# Patient Record
Sex: MALE | Race: ASIAN | NOT HISPANIC OR LATINO | URBAN - METROPOLITAN AREA
[De-identification: names, ages, dates, MRNs, and addresses within clinical notes are randomized per-mention and may not be internally consistent; named-entity substitution may affect disease eponyms.]

---

## 2021-10-27 ENCOUNTER — EMERGENCY (EMERGENCY)
Facility: HOSPITAL | Age: 31
LOS: 1 days | Discharge: HOME | End: 2021-10-27
Attending: EMERGENCY MEDICINE | Admitting: EMERGENCY MEDICINE
Payer: COMMERCIAL

## 2021-10-27 VITALS
SYSTOLIC BLOOD PRESSURE: 108 MMHG | WEIGHT: 173.06 LBS | HEART RATE: 68 BPM | TEMPERATURE: 99 F | HEIGHT: 70 IN | OXYGEN SATURATION: 100 % | DIASTOLIC BLOOD PRESSURE: 63 MMHG | RESPIRATION RATE: 16 BRPM

## 2021-10-27 PROCEDURE — 12011 RPR F/E/E/N/L/M 2.5 CM/<: CPT

## 2021-10-27 PROCEDURE — 90471 IMMUNIZATION ADMIN: CPT

## 2021-10-27 PROCEDURE — 90715 TDAP VACCINE 7 YRS/> IM: CPT

## 2021-10-27 PROCEDURE — 99283 EMERGENCY DEPT VISIT LOW MDM: CPT | Mod: 25

## 2021-10-27 RX ORDER — TETANUS TOXOID, REDUCED DIPHTHERIA TOXOID AND ACELLULAR PERTUSSIS VACCINE, ADSORBED 5; 2.5; 8; 8; 2.5 [IU]/.5ML; [IU]/.5ML; UG/.5ML; UG/.5ML; UG/.5ML
0.5 SUSPENSION INTRAMUSCULAR ONCE
Refills: 0 | Status: COMPLETED | OUTPATIENT
Start: 2021-10-27 | End: 2021-10-27

## 2021-10-27 RX ADMIN — TETANUS TOXOID, REDUCED DIPHTHERIA TOXOID AND ACELLULAR PERTUSSIS VACCINE, ADSORBED 0.5 MILLILITER(S): 5; 2.5; 8; 8; 2.5 SUSPENSION INTRAMUSCULAR at 16:00

## 2021-10-27 NOTE — ED PROVIDER NOTE - PATIENT PORTAL LINK FT
You can access the FollowMyHealth Patient Portal offered by [facility] by registering at the following website: [email or web address]. By joining [employer]’s FollowMyHealth portal, you will also be able to view your health information using other applications (apps) compatible with our system.

## 2021-10-27 NOTE — ED PROVIDER NOTE - CARE PROVIDER_API CALL
Rojelio Moreira  Internal Medicine  [address]  [place] [id number]  Phone: [phone]  Fax: [phone]  Follow Up Time: 1-3 Days

## 2021-10-27 NOTE — ED ADULT NURSE NOTE - OBJECTIVE STATEMENT
Complaining of laceration to left eyebrow, s/p accidentally hit head on the car door, no bleeding in triage, DSD applied. Denies LOC.

## 2021-10-27 NOTE — ED PROVIDER NOTE - NSFOLLOWUPINSTRUCTIONS_ED_ALL_ED_FT
return for suture removal in 5 days    Laceration    A laceration is a cut that goes through all of the layers of the skin and into the tissue that is right under the skin. Some lacerations heal on their own. Others need to be closed with skin adhesive strips, skin glue, stitches (sutures), or staples. Proper laceration care minimizes the risk of infection and helps the laceration to heal better.  If non-absorbable stitches or staples have been placed, they must be taken out within the time frame instructed by your healthcare provider.    SEEK IMMEDIATE MEDICAL CARE IF YOU HAVE ANY OF THE FOLLOWING SYMPTOMS: swelling around the wound, worsening pain, drainage from the wound, red streaking going away from your wound, inability to move finger or toe near the laceration, or discoloration of skin near the laceration.

## 2021-10-27 NOTE — ED ADULT TRIAGE NOTE - HEART RATE (BEATS/MIN)
Gilberto Ferrer called to cancel appointment today due to he will be taking her to the ER for back and hip pain. Suggested patient have INR checked while in ER. Patient to see Dr. Awa De Leon next week. Gilberto Ferrer will call back to reschedule or with update on patient condition.
66

## 2021-10-27 NOTE — ED PROVIDER NOTE - CARE PLAN
1 Principal Discharge DX:	Laceration of left eyebrow   Principal Discharge DX:	Laceration of left eyebrow  Secondary Diagnosis:	Head injury

## 2021-10-27 NOTE — ED PROVIDER NOTE - OBJECTIVE STATEMENT
30 y/o w/ no PMHx presents to ED c/o laceration L eyebrow s/p injured opening car door to get into car. Pt related last tdap >10 years ago. Denies LOC headache dizziness n/v weakness numbness or any other injury. Pt declining plastics for laceration repair. 32 y/o w/ no PMHx presents to ED c/o laceration L eyebrow s/p injured opening car door to get into car. Pt related last tdap >10 years ago. Denies LOC headache dizziness n/v weakness numbness or any other injury. Pt declining plastics for laceration repair.  pmd - roberto

## 2021-10-27 NOTE — ED ADULT NURSE NOTE - NSIMPLEMENTINTERV_GEN_ALL_ED
Implemented All Universal Safety Interventions:  [place] to call system. Call bell, personal items and telephone within reach. Instruct patient to call for assistance. Room bathroom lighting operational. Non-slip footwear when patient is off stretcher. Physically safe environment: no spills, clutter or unnecessary equipment. Stretcher in lowest position, wheels locked, appropriate side rails in place.

## 2021-11-03 ENCOUNTER — EMERGENCY (EMERGENCY)
Facility: HOSPITAL | Age: 31
LOS: 1 days | Discharge: HOME | End: 2021-11-03
Attending: EMERGENCY MEDICINE | Admitting: EMERGENCY MEDICINE
Payer: COMMERCIAL

## 2021-11-03 VITALS
SYSTOLIC BLOOD PRESSURE: 118 MMHG | OXYGEN SATURATION: 100 % | HEART RATE: 64 BPM | DIASTOLIC BLOOD PRESSURE: 71 MMHG | RESPIRATION RATE: 16 BRPM

## 2021-11-03 VITALS
HEIGHT: 70 IN | HEART RATE: 63 BPM | DIASTOLIC BLOOD PRESSURE: 70 MMHG | OXYGEN SATURATION: 100 % | TEMPERATURE: 98 F | SYSTOLIC BLOOD PRESSURE: 117 MMHG | WEIGHT: 171.96 LBS | RESPIRATION RATE: 15 BRPM

## 2021-11-03 PROBLEM — Z78.9 OTHER SPECIFIED HEALTH STATUS: Chronic | Status: ACTIVE | Noted: 2021-10-27

## 2021-11-03 PROCEDURE — G0463: CPT

## 2021-11-03 PROCEDURE — L9995: CPT

## 2021-11-03 NOTE — ED PROVIDER NOTE - NSFOLLOWUPINSTRUCTIONS_ED_ALL_ED_FT
KEEP AREA CLEAN AND DRY AND OUT OF DIRECT SUN EXPOSURE  Wash YOUR GABBY WITH SOAP AND WATER TWICE DAILY   FOLLOW UP WITH __your primary care doctor ________ AS DIRECTED.     IF NEEDED, CALL [phone] TO FIND A PRIMARY CARE PHYSICIAN.  OR CALL [phone] TO MAKE AN APPOINTMENT WITH THE MEDICAL CLINIC.    RETURN TO THE ER FOR ANY WORSENING SYMPTOMS.

## 2021-11-03 NOTE — ED PROVIDER NOTE - CLINICAL SUMMARY MEDICAL DECISION MAKING FREE TEXT BOX
30 y/o M s/p facial alc on 10/27 3 sutures, no fever or chills no drainage tdap was updated on last visit  , plan= suture removal and fu with PCP

## 2021-11-03 NOTE — ED PROVIDER NOTE - OBJECTIVE STATEMENT
30 y/o M here for suture removal. Suffered lac to L eyebrow on 10/7, 3 sutures placed, TDAP updated. Not on abx. Feels well, no fever or chills no HA. Denies drainage from wound

## 2021-11-03 NOTE — ED ADULT NURSE NOTE - OBJECTIVE STATEMENT
Patient is here for suture removal left brow s/p suturing here on 10/27/21. Wound well healed, no redness, swelling drainage or pain. Patient is here for suture removal left brow s/p suturing here on 10/27/21 after sustaining a laceration when he accidentally hit himself with a car door. Wound well healed, no redness, swelling drainage or pain.

## 2021-11-03 NOTE — ED PROVIDER NOTE - PHYSICAL EXAMINATION
PE:   GEN: Awake, alert, interactive, NAD, non-toxic appearing.   EYES: Sclera white, conjunctiva pink, PERRLA    NEURO: AOx3, CN II-XII grossly intact without focal deficit.     SKIN: Warm, dry, normal color, without apparent rashes. L eyebrow with helaed lac 3 sutures in place no redness or driaange

## 2021-11-03 NOTE — ED PROVIDER NOTE - ATTENDING CONTRIBUTION TO CARE
Left eyebrow lac sutured 1 week ago presents for suture removal today. Denies complication.  PE VSS afebrile. 3 sutures in place. No infection. Healing well.   Plan - suture removal. PCP follow up.    I performed a history and physical exam of the patient and discussed their management with the advanced care provider. I reviewed the advanced care provider's note and agree with the documented findings and plan of care. My medical decision making and objective findings are found above.

## 2021-11-03 NOTE — ED ADULT TRIAGE NOTE - BRAND OF COVID-19 VACCINATION
How Severe Is Your Skin Lesion?: mild
Have Your Skin Lesions Been Treated?: not been treated
Is This A New Presentation, Or A Follow-Up?: Skin Lesions
[employer] dose 1 and 2

## 2021-11-03 NOTE — ED PROVIDER NOTE - PATIENT PORTAL LINK FT
You can access the FollowMyHealth Patient Portal offered by [facility] by registering at the following website: [email or web address]. By joining [employer]’s FollowMyHealth portal, you will also be able to view your health information using other applications (apps) compatible with our system.

## 2022-02-08 PROBLEM — Z00.00 ENCOUNTER FOR PREVENTIVE HEALTH EXAMINATION: Status: ACTIVE | Noted: 2022-02-08

## 2022-02-10 ENCOUNTER — NON-APPOINTMENT (OUTPATIENT)
Age: 32
End: 2022-02-10

## 2022-02-10 ENCOUNTER — APPOINTMENT (OUTPATIENT)
Dept: ORTHOPEDIC SURGERY | Facility: CLINIC | Age: 32
End: 2022-02-10
Payer: COMMERCIAL

## 2022-02-10 DIAGNOSIS — G56.22 LESION OF ULNAR NERVE, LEFT UPPER LIMB: ICD-10-CM

## 2022-02-10 PROCEDURE — 99204 OFFICE O/P NEW MOD 45 MIN: CPT

## 2025-07-30 NOTE — ED PROVIDER NOTE - CROS ED ROS STATEMENT
Patient returned [facility], phone call regarding missing today's appointment. She forgot that she was scheduled for today, but canceled all remaining appointments do to feeling better as well as changing insurances.    all other ROS negative except as per HPI